# Patient Record
Sex: MALE | Race: BLACK OR AFRICAN AMERICAN | RURAL
[De-identification: names, ages, dates, MRNs, and addresses within clinical notes are randomized per-mention and may not be internally consistent; named-entity substitution may affect disease eponyms.]

---

## 2017-05-03 ENCOUNTER — OFFICE VISIT (OUTPATIENT)
Dept: FAMILY MEDICINE CLINIC | Age: 22
End: 2017-05-03

## 2017-05-03 VITALS
TEMPERATURE: 97.3 F | SYSTOLIC BLOOD PRESSURE: 118 MMHG | RESPIRATION RATE: 17 BRPM | HEIGHT: 71 IN | BODY MASS INDEX: 28.7 KG/M2 | HEART RATE: 69 BPM | WEIGHT: 205 LBS | DIASTOLIC BLOOD PRESSURE: 78 MMHG | OXYGEN SATURATION: 99 %

## 2017-05-03 DIAGNOSIS — F90.9 ATTENTION DEFICIT HYPERACTIVITY DISORDER (ADHD), UNSPECIFIED ADHD TYPE: Primary | ICD-10-CM

## 2017-05-03 RX ORDER — METHYLPHENIDATE HYDROCHLORIDE 18 MG/1
18 TABLET ORAL DAILY
Qty: 30 TAB | Refills: 0 | Status: SHIPPED | OUTPATIENT
Start: 2017-05-03 | End: 2018-07-18

## 2017-05-03 NOTE — PATIENT INSTRUCTIONS
Attention Deficit Hyperactivity Disorder (ADHD) in Adults: Care Instructions  Your Care Instructions  Attention deficit hyperactivity disorder, or ADHD, is a condition that makes it hard to pay attention. So you may have problems when you try to focus, get organized, and finish tasks. It might make you more active than other people. Or you might do things without thinking first.  ADHD is very common. It usually starts in early childhood. Many adults don't realize they have it until their children are diagnosed. Then they become aware of their own symptoms. Doctors don't know what causes ADHD. But it often runs in families. ADHD can be treated with medicines, behavior training, and counseling. Treatment can improve your life. Follow-up care is a key part of your treatment and safety. Be sure to make and go to all appointments, and call your doctor if you are having problems. It's also a good idea to know your test results and keep a list of the medicines you take. How can you care for yourself at home? · Learn all you can about ADHD. This will help you and your family understand it better. · Take your medicines exactly as prescribed. Call your doctor if you think you are having a problem with your medicine. You will get more details on the specific medicines your doctor prescribes. · If you miss a dose of your medicine, do not take an extra dose. · If your doctor suggests counseling, find a counselor you like and trust. Talk openly and honestly. Be willing to make some changes. · Find a support group for adults with ADHD. Talking to others with the same problems can help you feel better. It can also give you ideas about how to best cope with the condition. · Get rid of distractions at your work space. Keep your desk clean. Try not to face a window or busy hallway. · Use files, planners, and other tools to keep you organized. · Limit use of alcohol, and do not use illegal drugs.  People with ADHD tend to become addicted more easily than others. Tell your doctor if you need help to quit. Counseling, support groups, and sometimes medicines can help you stay free of alcohol or drugs. · Get at least 30 minutes of physical activity on most days of the week. Exercise has been shown to help people cope with ADHD. Walking is a good choice. You also may want to do other activities, such as running, swimming, cycling, or playing tennis or team sports. When should you call for help? Watch closely for changes in your health, and be sure to contact your doctor if:  · You feel sad a lot or cry all the time. · You have trouble sleeping, or you sleep too much. · You find it hard to concentrate, make decisions, or remember things. · You change how you normally eat. · You feel guilty for no reason. Where can you learn more? Go to http://paty-olman.info/. Enter B196 in the search box to learn more about \"Attention Deficit Hyperactivity Disorder (ADHD) in Adults: Care Instructions. \"  Current as of: July 26, 2016  Content Version: 11.2  © 1085-7103 [employer] Incorporated. Care instructions adapted under license by [employer] (which disclaims liability or warranty for this information). If you have questions about a medical condition or this instruction, always ask your healthcare professional. [address] any warranty or liability for your use of this information.

## 2017-05-03 NOTE — MR AVS SNAPSHOT
Visit Information Date & Time Provider Department Dept. Phone Encounter #  
 5/3/2017  2:00 PM Emilia Birmingham PA-C [address] [id number] Upcoming Health Maintenance Date Due Pneumococcal 19-64 Medium Risk (1 of 1 - PPSV23) 9/1/2013 DTaP/Tdap/Td series (5 - Tdap) 9/1/2015 INFLUENZA AGE 9 TO ADULT 8/1/2017 Allergies as of 5/3/2017  Review Complete On: 5/3/2017 By: Emiila Birmingham PA-C No Known Allergies Current Immunizations  Never Reviewed Name Date DTaP 12/11/1996, 3/13/1995, 1/9/1995, 1994 Hep B Vaccine 1994, 1994 Hib 12/11/1996, 3/13/1995, 1/9/1995, 1994 MMR 9/11/1995 Poliovirus vaccine 3/13/1995, 1/9/1995, 1994 Varicella Virus Vaccine 8/30/1999 Not reviewed this visit You Were Diagnosed With   
  
 Codes Comments Attention deficit hyperactivity disorder (ADHD), unspecified ADHD type    -  Primary ICD-10-CM: F90.9 ICD-9-CM: 314.01 Vitals BP Pulse Temp Resp Height(growth percentile) 118/78 (BP 1 Location: Left arm, BP Patient Position: Sitting) 69 97.3 °F (36.3 °C) (Temporal) 17 5' 11\" (1.803 m) Weight(growth percentile) SpO2 BMI Smoking Status 205 lb (93 kg) 99% 28.59 kg/m2 Current Every Day Smoker Vitals History BMI and BSA Data Body Mass Index Body Surface Area 28.59 kg/m 2 2.16 m 2 Preferred Pharmacy Pharmacy Name Phone [facility] [place] [address], [place]  [address] [phone] Your Updated Medication List  
  
   
This list is accurate as of: 5/3/17  2:37 PM.  Always use your most recent med list.  
  
  
  
  
 methylphenidate 18 mg CR tablet Commonly known as:  CONCERTA Take 1 Tab (18 mg total) by mouth daily. Max Daily Amount: 18 mg  
  
  
  
  
Prescriptions Printed  Refills  
 methylphenidate ER 18 mg 24 hr tab 0  
 Sig: Take 1 Tab (18 mg total) by mouth daily. Max Daily Amount: 18 mg Class: Print Route: Oral  
  
Patient Instructions Attention Deficit Hyperactivity Disorder (ADHD) in Adults: Care Instructions Your Care Instructions Attention deficit hyperactivity disorder, or ADHD, is a condition that makes it hard to pay attention. So you may have problems when you try to focus, get organized, and finish tasks. It might make you more active than other people. Or you might do things without thinking first. 
ADHD is very common. It usually starts in early childhood. Many adults don't realize they have it until their children are diagnosed. Then they become aware of their own symptoms. Doctors don't know what causes ADHD. But it often runs in families. ADHD can be treated with medicines, behavior training, and counseling. Treatment can improve your life. Follow-up care is a key part of your treatment and safety. Be sure to make and go to all appointments, and call your doctor if you are having problems. It's also a good idea to know your test results and keep a list of the medicines you take. How can you care for yourself at home? · Learn all you can about ADHD. This will help you and your family understand it better. · Take your medicines exactly as prescribed. Call your doctor if you think you are having a problem with your medicine. You will get more details on the specific medicines your doctor prescribes. · If you miss a dose of your medicine, do not take an extra dose. · If your doctor suggests counseling, find a counselor you like and trust. Talk openly and honestly. Be willing to make some changes. · Find a support group for adults with ADHD. Talking to others with the same problems can help you feel better. It can also give you ideas about how to best cope with the condition. · Get rid of distractions at your work space. Keep your desk clean. Try not to face a window or busy hallway. · Use files, planners, and other tools to keep you organized. · Limit use of alcohol, and do not use illegal drugs. People with ADHD tend to become addicted more easily than others. Tell your doctor if you need help to quit. Counseling, support groups, and sometimes medicines can help you stay free of alcohol or drugs. · Get at least 30 minutes of physical activity on most days of the week. Exercise has been shown to help people cope with ADHD. Walking is a good choice. You also may want to do other activities, such as running, swimming, cycling, or playing tennis or team sports. When should you call for help? Watch closely for changes in your health, and be sure to contact your doctor if: 
· You feel sad a lot or cry all the time. · You have trouble sleeping, or you sleep too much. · You find it hard to concentrate, make decisions, or remember things. · You change how you normally eat. · You feel guilty for no reason. Where can you learn more? Go to http://paty-olman.info/. Enter B196 in the search box to learn more about \"Attention Deficit Hyperactivity Disorder (ADHD) in Adults: Care Instructions. \" Current as of: July 26, 2016 Content Version: 11.2 © 8181-4590 [employer]orporated. Care instructions adapted under license by [employer] (which disclaims liability or warranty for this information). If you have questions about a medical condition or this instruction, always ask your healthcare professional. [place] [address] any warranty or liability for your use of this information. Introducing [facility]! [facility] introduces [employer] patient portal. Now you can access parts of your medical record, email your doctor's office, and request medication refills online. 1. In your internet browser, go to [email or web address] 2. Click on the First Time User? Click Here link in the Sign In box.  You will see the New Member Sign Up page. 3. Enter your [employer] Access Code exactly as it appears below. You will not need to use this code after youve completed the sign-up process. If you do not sign up before the expiration date, you must request a new code. · [employer] Access Code: [email or web address] Expires: 8/1/2017  1:28 PM 
 
4. Enter the last four digits of your Social Security Number (xxxx) and Date of Birth (mm/dd/yyyy) as indicated and click Submit. You will be taken to the next sign-up page. 5. Create a SocMetrics ID. This will be your SocMetrics login ID and cannot be changed, so think of one that is secure and easy to remember. 6. Create a [employer] password. You can change your password at any time. 7. Enter your Password Reset Question and Answer. This can be used at a later time if you forget your password. 8. Enter your e-mail address. You will receive e-mail notification when new information is available in [address]. 9. Click Sign Up. You can now view and download portions of your medical record. 10. Click the Download Summary menu link to download a portable copy of your medical information. If you have questions, please visit the Frequently Asked Questions section of the [employer] website. Remember, [employer] is NOT to be used for urgent needs. For medical emergencies, dial 911. Now available from your iPhone and Android! Please provide this summary of care documentation to your next provider. Your primary care clinician is listed as NONE. If you have any questions after today's visit, please call [phone].

## 2017-05-03 NOTE — PROGRESS NOTES
Chief Complaint   Patient presents with    Medication Evaluation     patient would like to be placed on ADHD medication Ritalin

## 2017-05-03 NOTE — PROGRESS NOTES
Mekhi Plummer is a 24 y.o. male who presents to the office today with the following:  Chief Complaint   Patient presents with    Medication Evaluation     patient would like to be placed on ADHD medication Ritalin       HPI  Would like refill of Ritalin. Has been out in over a month. Does not take consistently (last Rx 2 yrs ago). Hx ADHD in childhood since ~9 yo. Now has difficulty focusing at work. A lot of distractions. Is a . Denies every having any weight issues or SEs of medication (does note was hyper and took that away, helped him focus). Denies any josiah, depression, anxiety, or other sxs. Otherwise feels to be in good health with no other complaints. Review of Systems   Constitutional: Negative for malaise/fatigue. Respiratory: Negative for shortness of breath. Cardiovascular: Negative for chest pain and leg swelling. Gastrointestinal: Negative. Genitourinary: Negative. Skin: Negative for rash. Neurological: Negative. Negative for dizziness, tingling, sensory change, speech change and headaches. Psychiatric/Behavioral: Negative for depression, hallucinations, memory loss, substance abuse and suicidal ideas. The patient is not nervous/anxious and does not have insomnia. See HPI. No Known Allergies    Current Outpatient Prescriptions   Medication Sig    methylphenidate ER 18 mg 24 hr tab Take 1 Tab (18 mg total) by mouth daily. Max Daily Amount: 18 mg     No current facility-administered medications for this visit. History reviewed. No pertinent past medical history.     Past Surgical History:   Procedure Laterality Date    HX ORTHOPAEDIC      left ankle       Social History     Social History    Marital status: SINGLE     Spouse name: N/A    Number of children: N/A    Years of education: N/A     Social History Main Topics    Smoking status: Current Every Day Smoker     Packs/day: 0.25     Years: 1.00     Types: Cigarettes    Smokeless tobacco: Never Used    Alcohol use No    Drug use: Yes     Special: Marijuana      Comment: Marijuana occas.  Sexual activity: Yes     Partners: Female     Birth control/ protection: Condom     Other Topics Concern    None     Social History Narrative       History reviewed. No pertinent family history. Physical Exam:  Visit Vitals    /78 (BP 1 Location: Left arm, BP Patient Position: Sitting)    Pulse 69    Temp 97.3 °F (36.3 °C) (Temporal)    Resp 17    Ht 5' 11\" (1.803 m)    Wt 205 lb (93 kg)    SpO2 99%    BMI 28.59 kg/m2     Physical Exam   Constitutional: He is oriented to person, place, and time and well-developed, well-nourished, and in no distress. Athletic build. AAM.   HENT:   Head: Normocephalic and atraumatic. Right Ear: External ear normal.   Left Ear: External ear normal.   Nose: Nose normal.   Mouth/Throat: Oropharynx is clear and moist.   Eyes: Conjunctivae and EOM are normal.   Neck: Normal range of motion. Neck supple. Cardiovascular: Normal rate, regular rhythm and normal heart sounds. Pulmonary/Chest: Effort normal and breath sounds normal.   Musculoskeletal: Normal range of motion. He exhibits no edema. Lymphadenopathy:     He has no cervical adenopathy. Neurological: He is alert and oriented to person, place, and time. Gait normal.   Skin: Skin is warm and dry. Psychiatric: Mood and affect normal. His mood appears not anxious. He does not exhibit a depressed mood. He expresses no homicidal and no suicidal ideation. Nursing note and vitals reviewed. Assessment/Plan:    ICD-10-CM ICD-9-CM    1. Attention deficit hyperactivity disorder (ADHD), unspecified ADHD type F90.9 314.01 methylphenidate ER 18 mg 24 hr tab     Counseled pt on potential medication AEs/interactions. Pt willing to try Concerta. Rec see Psychiatry for further eval and tx, pt declines for now.    He understands this is a requirement to seek tx with other meds due to heavy abuse potential.    Follow-up Disposition:  Return in about 1 month (around 6/3/2017), or sooner prn.     Emilia Birmingham PA-C

## 2017-05-04 DIAGNOSIS — F90.9 ATTENTION DEFICIT HYPERACTIVITY DISORDER (ADHD), UNSPECIFIED ADHD TYPE: ICD-10-CM

## 2017-05-04 RX ORDER — METHYLPHENIDATE HYDROCHLORIDE 18 MG/1
18 TABLET ORAL DAILY
Qty: 30 TAB | Refills: 0 | OUTPATIENT
Start: 2017-05-04

## 2017-05-09 ENCOUNTER — TELEPHONE (OUTPATIENT)
Dept: FAMILY MEDICINE CLINIC | Age: 22
End: 2017-05-09

## 2017-05-09 NOTE — TELEPHONE ENCOUNTER
Asked nurse to check on the PA for his medication and nurse called Mehrdad Storm and they reran it and if was approved,mother notified.

## 2018-07-18 ENCOUNTER — OFFICE VISIT (OUTPATIENT)
Dept: FAMILY MEDICINE CLINIC | Age: 23
End: 2018-07-18

## 2018-07-18 VITALS
DIASTOLIC BLOOD PRESSURE: 70 MMHG | HEART RATE: 67 BPM | OXYGEN SATURATION: 97 % | WEIGHT: 205 LBS | BODY MASS INDEX: 28.7 KG/M2 | TEMPERATURE: 98.3 F | SYSTOLIC BLOOD PRESSURE: 105 MMHG | HEIGHT: 71 IN | RESPIRATION RATE: 20 BRPM

## 2018-07-18 DIAGNOSIS — R30.0 DYSURIA: ICD-10-CM

## 2018-07-18 DIAGNOSIS — R35.0 URINARY FREQUENCY: ICD-10-CM

## 2018-07-18 DIAGNOSIS — Z11.3 SCREENING FOR STD (SEXUALLY TRANSMITTED DISEASE): ICD-10-CM

## 2018-07-18 DIAGNOSIS — K40.90 INGUINAL HERNIA OF LEFT SIDE WITHOUT OBSTRUCTION OR GANGRENE: Primary | ICD-10-CM

## 2018-07-18 LAB
BILIRUB UR QL STRIP: NEGATIVE
GLUCOSE UR-MCNC: NEGATIVE MG/DL
KETONES P FAST UR STRIP-MCNC: NEGATIVE MG/DL
PH UR STRIP: 6.5 [PH] (ref 4.6–8)
PROT UR QL STRIP: NEGATIVE
SP GR UR STRIP: 1.02 (ref 1–1.03)
UA UROBILINOGEN AMB POC: NORMAL (ref 0.2–1)
URINALYSIS CLARITY POC: CLEAR
URINALYSIS COLOR POC: YELLOW
URINE BLOOD POC: NEGATIVE
URINE LEUKOCYTES POC: NEGATIVE
URINE NITRITES POC: NEGATIVE

## 2018-07-18 NOTE — MR AVS SNAPSHOT
[address] [place] [address] 
 
 
 [address] [phone] Patient: Guerline Engle MRN: [id number] DILLON:[birth year] Visit Information Date & Time Provider Department Dept. Phone Encounter #  
 7/18/2018  1:00 PM Grace Guerrero NP [phone] [id number] Follow-up Instructions Return if symptoms worsen or fail to improve. Follow-up and Disposition History Your Appointments 8/7/2018  4:00 PM  
New Patient with Clive Mae MD  
[facility] [address] ([facility]) Appt Note: ref j stone- left inguinal hernia (malloyr) [facility] [address] [facility] [address] [facility] [facility] Upcoming Health Maintenance Date Due Pneumococcal 19-64 Medium Risk (1 of 1 - PPSV23) 9/1/2013 DTaP/Tdap/Td series (5 - Tdap) 9/1/2015 Influenza Age 5 to Adult 8/1/2018 Allergies as of 7/18/2018  Review Complete On: 7/18/2018 By: Grace Guerrero NP No Known Allergies Current Immunizations  Never Reviewed Name Date DTaP 12/11/1996, 3/13/1995, 1/9/1995, 1994 Hep B Vaccine 1994, 1994 Hib 12/11/1996, 3/13/1995, 1/9/1995, 1994 MMR 9/11/1995 Poliovirus vaccine 3/13/1995, 1/9/1995, 1994 Varicella Virus Vaccine 8/30/1999 Not reviewed this visit You Were Diagnosed With   
  
 Codes Comments Inguinal hernia of left side without obstruction or gangrene    -  Primary ICD-10-CM: K40.90 ICD-9-CM: 550.90 Dysuria     ICD-10-CM: R30.0 ICD-9-CM: 788.1 Urinary frequency     ICD-10-CM: R35.0 ICD-9-CM: 788.41 Screening for STD (sexually transmitted disease)     ICD-10-CM: Z11.3 ICD-9-CM: V74.5 Vitals BP Pulse Temp Resp Height(growth percentile) Weight(growth percentile) 105/70 67 98.3 °F (36.8 °C) 20 5' 11\" (1.803 m) 205 lb (93 kg) SpO2 BMI Smoking Status 97% 28.59 kg/m2 Current Every Day Smoker BMI and BSA Data Body Mass Index Body Surface Area 28.59 kg/m 2 2.16 m 2 Preferred Pharmacy Pharmacy Name Phone [address] [phone] Your Updated Medication List  
  
Notice  As of 7/18/2018  2:07 PM  
 You have not been prescribed any medications. We Performed the Following AMB POC URINALYSIS DIP STICK AUTO W/O MICRO [71312 CPT(R)] CT+NG+M GENITALIUM BY MARISA, UR [[id number] Custom] CULTURE, URINE [id number] CPT(R)] HIV 1/2 AG/AB, 4TH GENERATION,W RFLX CONFIRM [id number] CPT(R)] HSV 1/2 AB, IGM [id number] CPT(R)] REFERRAL TO SURGERY [AFV265 Custom] Comments:  
 R inguinal hernia, pt says it is tender T PALLIDUM SCREEN W/REFLEX [[id number] Custom] Follow-up Instructions Return if symptoms worsen or fail to improve. Referral Information Referral ID Referred By Referred To  
  
 [id number] JIAN 87Alex Mao MD   
   [address] [place] [address] Way Phone: [phone] Fax: [phone] Visits Status Start Date End Date 1 Authorized 7/18/18 7/18/19 If your referral has a status of pending review or denied, additional information will be sent to support the outcome of this decision. Patient Instructions Hernia: Care Instructions Your Care Instructions A hernia develops when tissue bulges through a weak spot in the wall of your belly. The groin area and the navel are common areas for a hernia. A hernia can also develop near the area of a surgery you had before. Pressure from lifting, straining, or coughing can tear the weak area, causing the hernia to bulge and be painful. If you cannot push a hernia back into place, the tissue may become trapped outside the belly wall.  If the hernia gets twisted and loses its blood supply, it will swell and die. This is called a strangulated hernia. It usually causes a lot of pain. It needs treatment right away. Some hernias need to be repaired to prevent a strangulated hernia. If your hernia causes symptoms or is large, you may need surgery. Follow-up care is a key part of your treatment and safety. Be sure to make and go to all appointments, and call your doctor if you are having problems. It's also a good idea to know your test results and keep a list of the medicines you take. How can you care for yourself at home? · Take care when lifting heavy objects. · Stay at a healthy weight. · Do not smoke. Smoking can cause coughing, which can cause your hernia to bulge. If you need help quitting, talk to your doctor about stop-smoking programs and medicines. These can increase your chances of quitting for good. · Talk with your doctor before wearing a corset or truss for a hernia. These devices are not recommended for treating hernias and sometimes can do more harm than good. There may be certain situations when your doctor thinks a truss would work, but these are rare. When should you call for help? Call your doctor now or seek immediate medical care if: 
  · You have new or worse belly pain.  
  · You are vomiting.  
  · You cannot pass stools or gas.  
  · You cannot push the hernia back into place with gentle pressure when you are lying down.  
  · The area over the hernia turns red or becomes tender.  
 Watch closely for changes in your health, and be sure to contact your doctor if you have any problems. Where can you learn more? Go to http://paty-olman.info/. Enter C129 in the search box to learn more about \"Hernia: Care Instructions. \" Current as of: May 12, 2017 Content Version: 11.7 © 3369-8934 [employer].  Care instructions adapted under license by [employer] (which disclaims liability or warranty for this information). If you have questions about a medical condition or this instruction, always ask your healthcare professional. [address] any warranty or liability for your use of this information. Introducing [facility]! [facility] introduces [employer] patient portal. Now you can access parts of your medical record, email your doctor's office, and request medication refills online. 1. In your internet browser, go to [email or web address] 2. Click on the First Time User? Click Here link in the Sign In box. You will see the New Member Sign Up page. 3. Enter your [employer] Access Code exactly as it appears below. You will not need to use this code after youve completed the sign-up process. If you do not sign up before the expiration date, you must request a new code. · [employer] Access Code: [email or web address] Expires: 10/16/2018  2:05 PM 
 
4. Enter the last four digits of your Social Security Number (xxxx) and Date of Birth (mm/dd/yyyy) as indicated and click Submit. You will be taken to the next sign-up page. 5. Create a [employer] ID. This will be your [employer] login ID and cannot be changed, so think of one that is secure and easy to remember. 6. Create a [employer] password. You can change your password at any time. 7. Enter your Password Reset Question and Answer. This can be used at a later time if you forget your password. 8. Enter your e-mail address. You will receive e-mail notification when new information is available in [address] 9. Click Sign Up. You can now view and download portions of your medical record. 10. Click the Download Summary menu link to download a portable copy of your medical information. If you have questions, please visit the Frequently Asked Questions section of the [employer] website. Remember, [employer] is NOT to be used for urgent needs. For medical emergencies, dial 911. Now available from your iPhone and Android! Please provide this summary of care documentation to your next provider. Your primary care clinician is listed as Iraida Webster. If you have any questions after today's visit, please call [phone].

## 2018-07-18 NOTE — PROGRESS NOTES
Subjective:     Chief Complaint   Patient presents with    Mass     groin area       Quang Mclaughlin is a 20 y.o. male who presents today with c/o a tender lump in his R groin area. He says he had some pain in that region and noticed it in the shower about a week ago. It hurts when he touches it. He works at [employer] and occasionally has to lift heavy objects but mostly he walks at work about 12-15 miles per day. Denies weight-lifting at the gym. He is also having burning when he urinates along with urinary frequency and urgency. Denies any penile discharge, ulcerations, or swollen testicles. Had what he thought was a tick bite (small red bump) in his groin the other day and is hoping it is not herpes. Recently had unprotected sex with one female partner 2 weeks ago but says she has not complained of any similar symptoms to him. Admits he had Chlamydia once in high school. Would like to be screened for STD's today. Patient Active Problem List   Diagnosis Code    ADHD (attention deficit hyperactivity disorder) F90.9       No past medical history on file. Current Outpatient Prescriptions:     methylphenidate ER 18 mg 24 hr tab, Take 1 Tab (18 mg total) by mouth daily. Max Daily Amount: 18 mg, Disp: 30 Tab, Rfl: 0    No Known Allergies    Past Surgical History:   Procedure Laterality Date    HX ORTHOPAEDIC      left ankle       History   Smoking Status    Current Every Day Smoker    Packs/day: 0.25    Years: 1.00    Types: Cigarettes   Smokeless Tobacco    Never Used       Social History     Social History    Marital status: SINGLE     Spouse name: N/A    Number of children: N/A    Years of education: N/A     Social History Main Topics    Smoking status: Current Every Day Smoker     Packs/day: 0.25     Years: 1.00     Types: Cigarettes    Smokeless tobacco: Never Used    Alcohol use No    Drug use: Yes     Special: Marijuana      Comment: Marijuana occas.     Sexual activity: Yes     Partners: Female     Birth control/ protection: Condom     Other Topics Concern    None     Social History Narrative       No family history on file. ROS:  Gen: denies fever, chills, or fatigue  GI[de-identified] denies abdominal pain, nausea, vomiting, diarrhea, or constipation. Denies rectal pain. : + dysuria, urinary frequency, and urgency. Denies hematuria. Reports foul-smelling urine. Denies penile discharge, swelling, or redness. Denies swollen or tender testicles. Denies ulcerations or warts in the genital area. Musculoskeletal: no joint pain, stiffness, or muscle cramps  Neuro: denies numbness/tingling   Skin: + soft lump under the skin in R groin area    Objective:     Visit Vitals    /70    Pulse 67    Temp 98.3 °F (36.8 °C)    Resp 20    Ht 5' 11\" (1.803 m)    Wt 205 lb (93 kg)    SpO2 97%    BMI 28.59 kg/m2     Body mass index is 28.59 kg/(m^2). General: Alert and oriented. No acute distress. Well nourished. Lungs: Breathing even and unlabored. All lobes clear to auscultation bilaterally   Heart :RRR, S1 and S2 normal intensity, no extra heart sounds  Extremities: Non-edematous  Abdomen: Soft and non-distended. Bowel sounds active. No tenderness to palpation. + Reducible right inguinal hernia present, tender to palpation. No erythema. Musculoskeletal: No joint pain, heat, erythema, or swelling. FROM in all joints. Psych: Mood and thought content appropriate for situation. Dressed appropriately and with good hygiene. Skin: Warm, dry, and intact. No results found for this visit on 07/18/18. Assessment/ Plan:     1. R inguinal hernia without obstruction or gangrene  Referred to general surgeon Dr Cruz Ward since pt says it is tender and bothering him. Instructed to go to the ER if hernia turns red, pain worsens, or if bowel movements become difficult. 2. Dysuria/ Urinary frequency  UA negative  Urine sent off for c/s. Could be related to STD given recent hx of unprotected sex. .    3. Screening for STD's  Urine sent off to be tested for Chlamydia, Gonorrhea, and Trich. Blood work done for herpes, syphilis, and HIV testing  Will call patient with results and have him follow-up for treatment if necessary. Verbal and written instructions (see AVS) provided.  Patient expresses understanding of diagnosis and treatment plan. Health Maintenance Due   Topic Date Due    Pneumococcal 19-64 Medium Risk (1 of 1 - PPSV23) 09/01/2013    DTaP/Tdap/Td series (5 - Tdap) 09/01/2015         Follow-up Disposition: Not on File      Jessica D. Loletta Felty, AUBREY

## 2018-07-20 LAB — BACTERIA UR CULT: NO GROWTH

## 2018-07-21 LAB
C TRACH RRNA UR QL NAA+PROBE: NEGATIVE
COMMENT, 180044: NORMAL
HIV 1+2 AB+HIV1 P24 AG SERPL QL IA: NON REACTIVE
HSV1+2 IGM SER IA-ACNC: 2.54 RATIO (ref 0–0.9)
M GENITALIUM DNA SPEC QL NAA+PROBE: NEGATIVE
N GONORRHOEA RRNA UR QL NAA+PROBE: NEGATIVE
T PALLIDUM AB SER QL IA: NEGATIVE

## 2018-07-24 ENCOUNTER — OFFICE VISIT (OUTPATIENT)
Dept: FAMILY MEDICINE CLINIC | Age: 23
End: 2018-07-24

## 2018-07-24 VITALS
SYSTOLIC BLOOD PRESSURE: 114 MMHG | BODY MASS INDEX: 29.31 KG/M2 | HEIGHT: 71 IN | DIASTOLIC BLOOD PRESSURE: 71 MMHG | WEIGHT: 209.38 LBS | HEART RATE: 60 BPM | TEMPERATURE: 98.4 F | OXYGEN SATURATION: 99 %

## 2018-07-24 DIAGNOSIS — A60.00 GENITAL HERPES SIMPLEX, UNSPECIFIED SITE: Primary | ICD-10-CM

## 2018-07-24 RX ORDER — VALACYCLOVIR HYDROCHLORIDE 1 G/1
1000 TABLET, FILM COATED ORAL 2 TIMES DAILY
Qty: 20 TAB | Refills: 0 | Status: SHIPPED | OUTPATIENT
Start: 2018-07-24 | End: 2018-08-03

## 2018-07-24 NOTE — PROGRESS NOTES
Subjective:     Chief Complaint   Patient presents with    Behavioral Problem     ADHD    Results     Labs     Leg Pain       Gaby Waters is a 20 y.o. male who presents today to discuss his recent labwork. He was seen last week for dysuria and an inguinal hernia. Was screened for STDs and tested positive for genital herpes. We discussed the incurable nature of the virus and educated him about potential flares. We discussed the anti-viral medications used to treat flares as well as the availability of chronic suppression therapy if he develops frequent flares. He was also educated about the mode of transmission via oral sex or sexual intercourse and his obligation to inform his current and all future partners of this disease. Regarding his inguinal hernia, he has an appt scheduled to see general surgeon Dr Cruz Ward on August 7th. Appt info given to patient. Patient Active Problem List   Diagnosis Code    ADHD (attention deficit hyperactivity disorder) F90.9       No past medical history on file. Current Outpatient Prescriptions:     valACYclovir (VALTREX) 1 gram tablet, Take 1 Tab by mouth two (2) times a day for 10 days. Indications: genital herpes simplex, Disp: 20 Tab, Rfl: 0    No Known Allergies    Past Surgical History:   Procedure Laterality Date    HX ORTHOPAEDIC      left ankle       History   Smoking Status    Current Every Day Smoker    Packs/day: 0.25    Years: 1.00    Types: Cigarettes   Smokeless Tobacco    Never Used       Social History     Social History    Marital status: SINGLE     Spouse name: N/A    Number of children: N/A    Years of education: N/A     Social History Main Topics    Smoking status: Current Every Day Smoker     Packs/day: 0.25     Years: 1.00     Types: Cigarettes    Smokeless tobacco: Never Used    Alcohol use No    Drug use: Yes     Special: Marijuana      Comment: Marijuana occas.     Sexual activity: Yes     Partners: Female     Birth control/ protection: Condom     Other Topics Concern    Not on file     Social History Narrative       No family history on file. ROS:  Gen: denies fever, chills, or fatigue  GI[de-identified] denies abdominal pain  : denies dysuria, hematuria, urinary frequency or urgency  Skin: he now admits that he did have some pustules in his private area that are starting to clear up now     Objective:     Visit Vitals    /71 (BP 1 Location: Left arm, BP Patient Position: Sitting)    Pulse 60    Temp 98.4 °F (36.9 °C) (Oral)    Ht 5' 11\" (1.803 m)    Wt 209 lb 6 oz (95 kg)    SpO2 99%    BMI 29.2 kg/m2     Body mass index is 29.2 kg/(m^2). General: Alert and oriented. No acute distress. Well nourished. Lungs: Breathing even and unlabored. All lobes clear to auscultation bilaterally   Heart :RRR, S1 and S2 normal intensity, no extra heart sounds  Abdomen: Soft and non-distended. Bowel sounds active. : deferred  Psych: Mood and thought content appropriate for situation. Dressed appropriately and with good hygiene. Skin: Warm, dry, and intact.      Results for orders placed or performed in visit on 07/18/18   CULTURE, URINE   Result Value Ref Range    Urine Culture, Routine No growth    CT+NG+M GENITALIUM BY MARISA, UR   Result Value Ref Range    M. genitalium by MARISA Negative Negative    Comment Comment     C. trachomatis by MARISA Negative Negative    N. gonorrhoeae by MARISA Negative Negative   T PALLIDUM SCREEN W/REFLEX   Result Value Ref Range    T. pallidum Abs Negative Negative   HIV 1/2 AG/AB, 4TH GENERATION,W RFLX CONFIRM   Result Value Ref Range    HIV SCREEN 4TH GENERATION WRFX Non Reactive Non Reactive   HSV 1/2 AB, IGM   Result Value Ref Range    HSV I/II Ab, IgM 2.54 (H) 0.00 - 0.90 Ratio   AMB POC URINALYSIS DIP STICK AUTO W/O MICRO   Result Value Ref Range    Color (UA POC) Yellow     Clarity (UA POC) Clear     Glucose (UA POC) Negative Negative    Bilirubin (UA POC) Negative Negative    Ketones (UA POC) Negative Negative    Specific gravity (UA POC) 1.025 1.001 - 1.035    Blood (UA POC) Negative Negative    pH (UA POC) 6.5 4.6 - 8.0    Protein (UA POC) Negative Negative    Urobilinogen (UA POC) 1 mg/dL 0.2 - 1    Nitrites (UA POC) Negative Negative    Leukocyte esterase (UA POC) Negative Negative       No results found for this visit on 07/24/18. Assessment/ Plan:     1. Genital herpes simplex, unspecified site  - Start valACYclovir (VALTREX) 1 gram tablet; Take 1 Tab by mouth two (2) times a day for 10 days. Indications: genital herpes simplex  Dispense: 20 Tab; Refill: 0  -Educated patient on nature of virus and mode of transmission.  -Advised him to wear condoms from now on and avoid all sexual activity during acute flares.  -Notify me for future flare-ups, will need anti-viral medication. -F/U prn      Orders Placed This Encounter    valACYclovir (VALTREX) 1 gram tablet     Sig: Take 1 Tab by mouth two (2) times a day for 10 days. Indications: genital herpes simplex     Dispense:  20 Tab     Refill:  0         Verbal and written instructions (see AVS) provided.  Patient expresses understanding of diagnosis and treatment plan. Health Maintenance Due   Topic Date Due    Pneumococcal 19-64 Medium Risk (1 of 1 - PPSV23) 09/01/2013    DTaP/Tdap/Td series (5 - Tdap) 09/01/2015         Follow-up Disposition: Not on File      Lida Cortez NP

## 2018-07-24 NOTE — PATIENT INSTRUCTIONS
Genital Herpes: Care Instructions  Your Care Instructions  Genital herpes is caused by a virus called herpes simplex. There are two types of this virus. Type 2 is the type that usually causes genital herpes. But type 1 can also cause it. Type 1 is the type that causes cold sores. Genital herpes is a sexually transmitted infection (STI). The most common way to get it is through sexual or other contact with someone who has herpes. For example, the virus can spread from a sore in the genital area to the lips. And it can spread from a cold sore on the lips to the genital area. Some people are surprised to find out that they have herpes or that they gave it to someone else. This is because a lot of people who have it don't know that they have it. They may not get sores or they may have sores that they cannot see. But the virus can still be spread by a person who does not have obvious sores or symptoms. There is no cure for herpes, but antiviral medicine can help you feel better and help prevent more outbreaks. This medicine may also lower the chance of spreading the virus. Follow-up care is a key part of your treatment and safety. Be sure to make and go to all appointments, and call your doctor if you are having problems. It's also a good idea to know your test results and keep a list of the medicines you take. How can you care for yourself at home? · Be safe with medicines. If your doctor prescribes medicine, take it exactly as prescribed. Call your doctor if you think you are having a problem with your medicine. You will get more details on the specific medicines your doctor prescribes. · To reduce the pain and itching from herpes sores:  ¨ Wash the area with water 3 or 4 times a day. ¨ Keep the sores clean and dry in between baths or showers. You may want to let the sores air dry. This may feel better than a towel. ¨ Wear cotton underwear. Cotton absorbs moisture well.   ¨ Take an over-the-counter pain medicine, such as acetaminophen (Tylenol), ibuprofen (Advil, Motrin), or naproxen (Aleve). Read and follow all instructions on the label. Do not take two or more pain medicines at the same time unless the doctor told you to. Many pain medicines have acetaminophen, which is Tylenol. Too much acetaminophen (Tylenol) can be harmful. To prevent the spread of genital herpes  · Latex condoms are a good way to reduce the risk of spreading the virus. But you can still get the virus even if you use a condom. For the best protection, use condoms every time you have sex, from the beginning to the end of sexual contact. Remember that you can infect someone even if you do not have obvious symptoms or sores. · Avoid sexual contact when you have symptoms. Symptoms include sores, tingling, or pain. · Wash your hands if you touch a sore. In some cases, especially if this is your first herpes outbreak, you can spread the virus to other parts of your body or to other people. · Having one sex partner (who does not have STIs and does not have sex with anyone else) is a good way to avoid STIs. · Talk to your sex partner or partners about genital herpes. · Encourage your sex partners to get a blood test to see if they have been infected. When should you call for help? Call your doctor now or seek immediate medical care if:    · You have a new fever.     · There is increasing redness or red streaks around herpes sores.    Watch closely for changes in your health, and be sure to contact your doctor if:    · You have herpes and you think you might be pregnant.     · You have an outbreak of herpes sores, and the sores are not healing.     · You have frequent outbreaks of genital herpes sores.     · You are unable to pass urine or are constipated.     · You want to start antiviral medicine.     · You do not get better as expected. Where can you learn more? Go to http://paty-olman.info/.   Enter O803 in the search box to learn more about \"Genital Herpes: Care Instructions. \"  Current as of: November 27, 2017  Content Version: 11.7  © 6049-6420 [employer]. Care instructions adapted under license by [employer] (which disclaims liability or warranty for this information). If you have questions about a medical condition or this instruction, always ask your healthcare professional. [place] [address] any warranty or liability for your use of this information.

## 2018-07-25 ENCOUNTER — TELEPHONE (OUTPATIENT)
Dept: FAMILY MEDICINE CLINIC | Age: 23
End: 2018-07-25

## 2018-07-25 NOTE — TELEPHONE ENCOUNTER
Called Dr. Leslie Nicolas office to see what they needed for this since they usually schedule themselves with patient.  Will ask leanne if I need to change referral to reflect Dr. Leslie Nicolas as referral physician

## 2018-07-25 NOTE — TELEPHONE ENCOUNTER
Pt mother called states pt is wanting to see Dr. Chintan Cagle for surgery and not Stibal. They are asking to have this changed and referral/notes sent to Dr. Chintan Cagle fax: [phone]

## 2018-07-26 ENCOUNTER — DOCUMENTATION ONLY (OUTPATIENT)
Dept: FAMILY MEDICINE CLINIC | Age: 23
End: 2018-07-26

## 2022-03-18 PROBLEM — A60.00 HERPES SIMPLEX INFECTION OF GENITOURINARY SYSTEM: Status: ACTIVE | Noted: 2018-07-24

## 2025-05-13 ENCOUNTER — APPOINTMENT (OUTPATIENT)
Facility: HOSPITAL | Age: 30
End: 2025-05-13
Payer: COMMERCIAL

## 2025-05-13 ENCOUNTER — HOSPITAL ENCOUNTER (EMERGENCY)
Facility: HOSPITAL | Age: 30
Discharge: HOME | End: 2025-05-13
Attending: EMERGENCY MEDICINE
Payer: COMMERCIAL

## 2025-05-13 VITALS
WEIGHT: 220 LBS | SYSTOLIC BLOOD PRESSURE: 146 MMHG | TEMPERATURE: 98.9 F | OXYGEN SATURATION: 97 % | BODY MASS INDEX: 31.5 KG/M2 | RESPIRATION RATE: 16 BRPM | HEART RATE: 109 BPM | DIASTOLIC BLOOD PRESSURE: 88 MMHG | HEIGHT: 70 IN

## 2025-05-13 DIAGNOSIS — S93.509A SPRAIN OF TOE, INITIAL ENCOUNTER: ICD-10-CM

## 2025-05-13 DIAGNOSIS — M79.674 TOE PAIN, RIGHT: Primary | ICD-10-CM

## 2025-05-13 PROCEDURE — 99283 EMERGENCY DEPT VISIT LOW MDM: CPT

## 2025-05-13 PROCEDURE — 73660 X-RAY EXAM OF TOE(S): CPT

## 2025-05-13 PROCEDURE — 6370000000 HC RX 637 (ALT 250 FOR IP): Performed by: EMERGENCY MEDICINE

## 2025-05-13 RX ORDER — IBUPROFEN 600 MG/1
600 TABLET, FILM COATED ORAL
Status: COMPLETED | OUTPATIENT
Start: 2025-05-13 | End: 2025-05-13

## 2025-05-13 RX ORDER — IBUPROFEN 600 MG/1
600 TABLET, FILM COATED ORAL 3 TIMES DAILY PRN
Qty: 30 TABLET | Refills: 0 | Status: SHIPPED | OUTPATIENT
Start: 2025-05-13

## 2025-05-13 RX ADMIN — IBUPROFEN 600 MG: 600 TABLET, FILM COATED ORAL at 22:12

## 2025-05-13 ASSESSMENT — LIFESTYLE VARIABLES
HOW MANY STANDARD DRINKS CONTAINING ALCOHOL DO YOU HAVE ON A TYPICAL DAY: 1 OR 2
HOW OFTEN DO YOU HAVE A DRINK CONTAINING ALCOHOL: MONTHLY OR LESS

## 2025-05-13 ASSESSMENT — PAIN - FUNCTIONAL ASSESSMENT: PAIN_FUNCTIONAL_ASSESSMENT: 0-10

## 2025-05-13 ASSESSMENT — PAIN DESCRIPTION - LOCATION: LOCATION: TOE (COMMENT WHICH ONE);OTHER (COMMENT)

## 2025-05-13 ASSESSMENT — PAIN SCALES - GENERAL: PAINLEVEL_OUTOF10: 7

## 2025-05-13 ASSESSMENT — PAIN DESCRIPTION - ORIENTATION: ORIENTATION: RIGHT

## 2025-05-14 NOTE — ED PROVIDER NOTES
[facility] EMERGENCY DEPARTMENT  EMERGENCY DEPARTMENT ENCOUNTER       Pt Name: Marcelino Alexander  MRN: [id number]  Birthdate 1995  Date of evaluation: 5/13/2025  Provider: Gold Armstrong MD   PCP: Marion Carbajal APRN - NP  Note Started: 10:05 PM 5/13/25      FINAL IMPRESSION     1. Toe pain, right    2. Sprain of toe, initial encounter          DISPOSITION/PLAN     Disposition:  DISPOSITION Decision To Discharge 05/13/2025 10:00:45 PM   DISPOSITION CONDITION Stable           Discharge Note: The patient is stable for discharge home. The signs, symptoms, diagnosis, and discharge instructions have been discussed, understanding conveyed, and agreed upon. The patient is to follow up as recommended or return to ER should their symptoms worsen.      PATIENT REFERRED TO:  Marion Carbajal APRN - NP  [address] [facility] [address] [phone]    Schedule an appointment as soon as possible for a visit in 2 days  For Follow Up    Akbar Ahumada MD  [address] [place], [place] [address] [phone]    Schedule an appointment as soon as possible for a visit in 2 days  For Follow Up, If Not Improving            DISCHARGE MEDICATIONS:     Medication List        START taking these medications      ibuprofen 600 MG tablet  Commonly known as: ADVIL;MOTRIN  Take 1 tablet by mouth 3 times daily as needed for Pain               Where to Get Your Medications        These medications were sent to [facility]/pharmacy #[id number] - [place], [place] - [address] [facility] [phone] - F [phone]  [address] [facility] [address]      Phone: [phone]   ibuprofen 600 MG tablet           DISCONTINUED MEDICATIONS:  Current Discharge Medication List          Return to ED if worse      CHIEF COMPLAINT       Chief Complaint   Patient presents with    Toe Pain     R foot 5th toe        HISTORY OF PRESENT ILLNESS: 1 or more elements      History From: Patient  None     [facility]    Marcelino EUGENE

## 2025-05-14 NOTE — ED TRIAGE NOTES
Patient came in with pain to his R foot 5th toe. Patient injured the toe about six months ago but yesterday he slipped at work and hit the toe again causing a lot of pain. He did not have the toe looked at after the initial injury.

## 2025-05-31 ENCOUNTER — HOSPITAL ENCOUNTER (EMERGENCY)
Facility: HOSPITAL | Age: 30
Discharge: HOME | End: 2025-06-01
Attending: EMERGENCY MEDICINE
Payer: COMMERCIAL

## 2025-05-31 DIAGNOSIS — F19.920 DRUG INTOXICATION WITHOUT COMPLICATION (HCC): Primary | ICD-10-CM

## 2025-05-31 PROCEDURE — 99283 EMERGENCY DEPT VISIT LOW MDM: CPT

## 2025-05-31 ASSESSMENT — LIFESTYLE VARIABLES
HOW OFTEN DO YOU HAVE A DRINK CONTAINING ALCOHOL: NEVER
HOW MANY STANDARD DRINKS CONTAINING ALCOHOL DO YOU HAVE ON A TYPICAL DAY: PATIENT DOES NOT DRINK

## 2025-05-31 ASSESSMENT — PAIN - FUNCTIONAL ASSESSMENT: PAIN_FUNCTIONAL_ASSESSMENT: WONG-BAKER FACES

## 2025-05-31 ASSESSMENT — PAIN SCALES - WONG BAKER: WONGBAKER_NUMERICALRESPONSE: NO HURT

## 2025-06-01 VITALS
RESPIRATION RATE: 15 BRPM | BODY MASS INDEX: 32.43 KG/M2 | HEART RATE: 92 BPM | DIASTOLIC BLOOD PRESSURE: 82 MMHG | WEIGHT: 226 LBS | OXYGEN SATURATION: 98 % | SYSTOLIC BLOOD PRESSURE: 130 MMHG | TEMPERATURE: 98.8 F

## 2025-06-01 LAB
AMPHET UR QL SCN: POSITIVE
BARBITURATES UR QL SCN: NEGATIVE
BENZODIAZ UR QL: NEGATIVE
CANNABINOIDS UR QL SCN: POSITIVE
COCAINE UR QL SCN: POSITIVE
Lab: ABNORMAL
METHADONE UR QL: NEGATIVE
OPIATES UR QL: NEGATIVE
PCP UR QL: NEGATIVE

## 2025-06-01 PROCEDURE — 80307 DRUG TEST PRSMV CHEM ANLYZR: CPT

## 2025-06-01 ASSESSMENT — PAIN SCALES - WONG BAKER: WONGBAKER_NUMERICALRESPONSE: NO HURT

## 2025-06-01 ASSESSMENT — PAIN - FUNCTIONAL ASSESSMENT: PAIN_FUNCTIONAL_ASSESSMENT: NONE - DENIES PAIN

## 2025-06-01 NOTE — ED NOTES
Patient is now awake, patient has refused labs and the urine screen. Patient states that he is ready to be discharged.

## 2025-06-01 NOTE — ED TRIAGE NOTES
Patient states that he smoked cannabis. Patient denies intentionally doing any other drugs. EMS was called by a friend when the patient showed up to their home intoxicated. Patient is alert to voice.

## 2025-06-01 NOTE — ED PROVIDER NOTES
DEPARTMENT COURSE and DIFFERENTIAL DIAGNOSIS/MDM   Vitals:    Vitals:    06/01/25 0115 06/01/25 0130 06/01/25 0145 06/01/25 0200   BP: (!) 142/77 (!) 144/76 (!) 147/75 130/82   Pulse: 89 92 90 92   Resp: 14 15 15 15   Temp:       SpO2:       Weight:              Medications Given in the ED:  Medications - No data to display            Provider Notes/Initial Impression:   Patient presents for evaluation after intoxication no medical complaints vital signs unremarkable except for slight hypertension will check a urine drug screen is concerned about smoking laced marijuana      CONSULTS: (Who and What was discussed)  None    Chronic Conditions: drug use    Social Determinants affecting Dx or Tx: None    Social Drivers of Health     Tobacco Use: High Risk (9/1/2022)    Received from [facility]    Patient History    • Smoking Tobacco Use: Every Day    • Smokeless Tobacco Use: Never    • Passive Exposure: Not on file   Alcohol Use: Not At Risk (5/31/2025)    AUDIT-C    • Frequency of Alcohol Consumption: Never    • Average Number of Drinks: Patient does not drink    • Frequency of Binge Drinking: Never   Financial Resource Strain: Not on file   Food Insecurity: Not on file   Transportation Needs: Not on file   Physical Activity: Not on file   Stress: Not on file   Social Connections: Not on file   Intimate Partner Violence: Not on file   Depression: Not at risk (11/10/2018)    Received from [facility]  (prior to 6/17/2023)    PHQ-2    • PHQ-2 Score: 0   Housing Stability: Not on file   Interpersonal Safety: Patient Unable To Answer (5/31/2025)    Interpersonal Safety Domain Source: IP Abuse Screening    • Physical abuse: Unable to assess    • Verbal abuse: Unable to assess    • Emotional abuse: Unable to assess    • Financial abuse: Unable to assess    • Sexual abuse: Unable to assess   Utilities: Not on file     Social Connections: Not on file       Records Reviewed (source and summary of